# Patient Record
Sex: MALE | Race: OTHER | HISPANIC OR LATINO | URBAN - METROPOLITAN AREA
[De-identification: names, ages, dates, MRNs, and addresses within clinical notes are randomized per-mention and may not be internally consistent; named-entity substitution may affect disease eponyms.]

---
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
## 2024-08-23 NOTE — ED PEDIATRIC NURSE NOTE - TEMPLATE
----- Message from Alison Bowling sent at 8/23/2024 10:28 AM CDT -----  Please call pt for   Bone density report: osteopenia. Advise to have an office follow-up visit to discuss the medication treatment. Advise fall precaution, regular exercise and take over the counter multiply vitamins with minerals.     Alison Bowling MD on 8/23/2024 at 10:27 AM;   Eye